# Patient Record
Sex: FEMALE | Race: WHITE | NOT HISPANIC OR LATINO
[De-identification: names, ages, dates, MRNs, and addresses within clinical notes are randomized per-mention and may not be internally consistent; named-entity substitution may affect disease eponyms.]

---

## 2023-09-08 ENCOUNTER — ASOB RESULT (OUTPATIENT)
Age: 33
End: 2023-09-08

## 2023-09-08 ENCOUNTER — APPOINTMENT (OUTPATIENT)
Dept: ANTEPARTUM | Facility: CLINIC | Age: 33
End: 2023-09-08
Payer: COMMERCIAL

## 2023-09-08 PROCEDURE — 76813 OB US NUCHAL MEAS 1 GEST: CPT

## 2023-09-08 PROCEDURE — 76801 OB US < 14 WKS SINGLE FETUS: CPT

## 2023-11-03 ENCOUNTER — ASOB RESULT (OUTPATIENT)
Age: 33
End: 2023-11-03

## 2023-11-03 ENCOUNTER — TRANSCRIPTION ENCOUNTER (OUTPATIENT)
Age: 33
End: 2023-11-03

## 2023-11-03 ENCOUNTER — APPOINTMENT (OUTPATIENT)
Dept: ANTEPARTUM | Facility: CLINIC | Age: 33
End: 2023-11-03
Payer: COMMERCIAL

## 2023-11-03 PROCEDURE — 76817 TRANSVAGINAL US OBSTETRIC: CPT

## 2023-11-03 PROCEDURE — 76811 OB US DETAILED SNGL FETUS: CPT | Mod: 59

## 2023-11-21 ENCOUNTER — APPOINTMENT (OUTPATIENT)
Dept: ANTEPARTUM | Facility: CLINIC | Age: 33
End: 2023-11-21
Payer: COMMERCIAL

## 2023-11-21 ENCOUNTER — ASOB RESULT (OUTPATIENT)
Age: 33
End: 2023-11-21

## 2023-11-21 PROCEDURE — 76816 OB US FOLLOW-UP PER FETUS: CPT

## 2024-01-04 ENCOUNTER — EMERGENCY (EMERGENCY)
Facility: HOSPITAL | Age: 34
LOS: 0 days | Discharge: HOME | End: 2024-01-04
Attending: EMERGENCY MEDICINE
Payer: COMMERCIAL

## 2024-01-04 VITALS — HEIGHT: 66 IN | WEIGHT: 136.91 LBS

## 2024-01-04 VITALS
OXYGEN SATURATION: 100 % | HEART RATE: 90 BPM | DIASTOLIC BLOOD PRESSURE: 95 MMHG | RESPIRATION RATE: 20 BRPM | TEMPERATURE: 98 F | SYSTOLIC BLOOD PRESSURE: 137 MMHG

## 2024-01-04 DIAGNOSIS — Z04.3 ENCOUNTER FOR EXAMINATION AND OBSERVATION FOLLOWING OTHER ACCIDENT: ICD-10-CM

## 2024-01-04 DIAGNOSIS — Z34.93 ENCOUNTER FOR SUPERVISION OF NORMAL PREGNANCY, UNSPECIFIED, THIRD TRIMESTER: ICD-10-CM

## 2024-01-04 PROCEDURE — 99284 EMERGENCY DEPT VISIT MOD MDM: CPT

## 2024-01-04 PROCEDURE — 99283 EMERGENCY DEPT VISIT LOW MDM: CPT

## 2024-01-04 PROCEDURE — 93010 ELECTROCARDIOGRAM REPORT: CPT

## 2024-01-04 PROCEDURE — 93005 ELECTROCARDIOGRAM TRACING: CPT

## 2024-01-04 NOTE — ED STATDOCS - MUSCULOSKELETAL, MLM
range of motion is not limited and there is no muscle tenderness. no midline spinal tenderness, no sacral ttp, no bony tenderness or abnormalities

## 2024-01-04 NOTE — ED ADULT TRIAGE NOTE - CHIEF COMPLAINT QUOTE
Patient ambulates to ER from home c/o falling down 5 stairs. Pt 29 weeks Pregnant. Denies any pain or discomfort. Pt stated she fell around 7pm. Last time she fell baby was 6:30pm. Spoke to Edna BARRIOS, pt to be seen in ER and they will come down after seen by MD.  No PMHx. NKA. No meds PTA.

## 2024-01-04 NOTE — ED STATDOCS - OBJECTIVE STATEMENT
34 y/o female, denies PMHx  approximately 29 weeks pregnant presents to the ED s/p slip and fall down 5 stairs around 19:00 landing on her buttock. no LOC, no head trauma, no abdominal pain, no vaginal bleeding, no vaginal discharge, no back pain. No acute complaints

## 2024-01-04 NOTE — ED STATDOCS - PATIENT PORTAL LINK FT
You can access the FollowMyHealth Patient Portal offered by [facility] by registering at the following website: [email or web address]. By joining [employer]’s FollowMyHealth portal, you will also be able to view your health information using other applications (apps) compatible with our system. You can access the FollowMyHealth Patient Portal offered by [facility] by registering at the following website: [email or web address]. By joining [employer]’s FollowMyHealth portal, you will also be able to view your health information using other applications (apps) compatible with our system.

## 2024-01-04 NOTE — ED STATDOCS - NSFOLLOWUPINSTRUCTIONS_ED_ALL_ED_FT
Return to the ER for contractions, abdominal pain, vaginal bleeding or fluid loss from vagina.    OK to take tylenol for sore muscles and pain from the fall.  Take tylenol 650mg every 4-6 hours as needed for pain.    See your OB/GYN within 1 week.

## 2024-01-04 NOTE — ED STATDOCS - CARE PLAN
1 Principal Discharge DX:	Fall down stairs, initial encounter  Secondary Diagnosis:	Third trimester pregnancy

## 2024-01-04 NOTE — ED STATDOCS - CLINICAL SUMMARY MEDICAL DECISION MAKING FREE TEXT BOX
32 y/o female, denies PMHx  approximately 29 weeks pregnant presents to the ED s/p slip and fall down 5 stairs around 19:00, Pt with no complaints at this time, fell onto buttocks, denies hitting abdomen, denies head injury, denies loc 32 y/o female, denies PMHx  approximately 29 weeks pregnant presents to the ED s/p slip and fall down 5 stairs around 19:00, Pt with no complaints at this time, fell onto buttocks, denies hitting abdomen, denies head injury, denies loc. L&D contacted who sent nurse for NST which is currently in progress, if cleared by OB pt can be discharged, no physical injuries on exam from fall 32 y/o female, denies PMHx  approximately 29 weeks pregnant presents to the ED s/p slip and fall down 5 stairs around 19:00, Pt with no complaints at this time, fell onto buttocks, denies hitting abdomen, denies head injury, denies loc. L&D contacted who sent nurse for NST which is currently in progress, if cleared by OB pt can be discharged, no physical injuries on exam from fall.    20:34 JG: Discussed case with OB attending Dr. Damon who reviewed patients NST which was normal and states ok for patient to be discharged from OB perspective.  Patient denies abdominal pain, vaginal bleeding or fluid loss, so pelvic US not recommended at this time but strict return precautions given for return to the ED if experiencing these symptoms. 34 y/o female, denies PMHx  approximately 29 weeks pregnant presents to the ED s/p slip and fall down 5 stairs around 19:00, Pt with no complaints at this time, fell onto buttocks, denies hitting abdomen, denies head injury, denies loc. L&D contacted who sent nurse for NST which is currently in progress, if cleared by OB pt can be discharged, no physical injuries on exam from fall.    20:34 JG: Discussed case with OB attending Dr. Damon who reviewed patients NST which was normal and states ok for patient to be discharged from OB perspective.  Patient denies abdominal pain, vaginal bleeding or fluid loss, so pelvic US not recommended at this time but strict return precautions given for return to the ED if experiencing these symptoms.

## 2024-01-04 NOTE — ED ADULT NURSE REASSESSMENT NOTE - NS ED NURSE REASSESS COMMENT FT1
Pt seen evaluated treated by Dr. Diaz. NO nursing interventions required. Pt discharged home with strict follow up instructions. Pt left ED with all belongings. 2019

## 2024-02-23 ENCOUNTER — APPOINTMENT (OUTPATIENT)
Dept: ANTEPARTUM | Facility: CLINIC | Age: 34
End: 2024-02-23
Payer: COMMERCIAL

## 2024-02-23 ENCOUNTER — INPATIENT (INPATIENT)
Facility: HOSPITAL | Age: 34
LOS: 2 days | Discharge: HOME | End: 2024-02-26
Attending: OBSTETRICS & GYNECOLOGY | Admitting: OBSTETRICS & GYNECOLOGY
Payer: COMMERCIAL

## 2024-02-23 ENCOUNTER — TRANSCRIPTION ENCOUNTER (OUTPATIENT)
Age: 34
End: 2024-02-23

## 2024-02-23 ENCOUNTER — ASOB RESULT (OUTPATIENT)
Age: 34
End: 2024-02-23

## 2024-02-23 VITALS
TEMPERATURE: 99 F | SYSTOLIC BLOOD PRESSURE: 139 MMHG | RESPIRATION RATE: 16 BRPM | HEIGHT: 72 IN | HEART RATE: 106 BPM | DIASTOLIC BLOOD PRESSURE: 93 MMHG | WEIGHT: 147.05 LBS

## 2024-02-23 DIAGNOSIS — O26.899 OTHER SPECIFIED PREGNANCY RELATED CONDITIONS, UNSPECIFIED TRIMESTER: ICD-10-CM

## 2024-02-23 DIAGNOSIS — Z98.890 OTHER SPECIFIED POSTPROCEDURAL STATES: Chronic | ICD-10-CM

## 2024-02-23 LAB
ABO RH CONFIRMATION: SIGNIFICANT CHANGE UP
ALBUMIN SERPL ELPH-MCNC: 2.7 G/DL — LOW (ref 3.3–5)
ALP SERPL-CCNC: 161 U/L — HIGH (ref 40–120)
ALT FLD-CCNC: 23 U/L — SIGNIFICANT CHANGE UP (ref 12–78)
ANION GAP SERPL CALC-SCNC: 7 MMOL/L — SIGNIFICANT CHANGE UP (ref 5–17)
APTT BLD: 24.1 SEC — LOW (ref 24.5–35.6)
AST SERPL-CCNC: 21 U/L — SIGNIFICANT CHANGE UP (ref 15–37)
BASOPHILS # BLD AUTO: 0.02 K/UL — SIGNIFICANT CHANGE UP (ref 0–0.2)
BASOPHILS NFR BLD AUTO: 0.2 % — SIGNIFICANT CHANGE UP (ref 0–2)
BILIRUB SERPL-MCNC: 0.3 MG/DL — SIGNIFICANT CHANGE UP (ref 0.2–1.2)
BLD GP AB SCN SERPL QL: SIGNIFICANT CHANGE UP
BUN SERPL-MCNC: 6 MG/DL — LOW (ref 7–23)
CALCIUM SERPL-MCNC: 9.3 MG/DL — SIGNIFICANT CHANGE UP (ref 8.5–10.1)
CHLORIDE SERPL-SCNC: 108 MMOL/L — SIGNIFICANT CHANGE UP (ref 96–108)
CO2 SERPL-SCNC: 23 MMOL/L — SIGNIFICANT CHANGE UP (ref 22–31)
CREAT ?TM UR-MCNC: 32 MG/DL — SIGNIFICANT CHANGE UP
CREAT SERPL-MCNC: 0.61 MG/DL — SIGNIFICANT CHANGE UP (ref 0.5–1.3)
EGFR: 122 ML/MIN/1.73M2 — SIGNIFICANT CHANGE UP
EOSINOPHIL # BLD AUTO: 0.03 K/UL — SIGNIFICANT CHANGE UP (ref 0–0.5)
EOSINOPHIL NFR BLD AUTO: 0.3 % — SIGNIFICANT CHANGE UP (ref 0–6)
FIBRINOGEN PPP-MCNC: 632 MG/DL — HIGH (ref 200–435)
GLUCOSE SERPL-MCNC: 118 MG/DL — HIGH (ref 70–99)
HCT VFR BLD CALC: 38.1 % — SIGNIFICANT CHANGE UP (ref 34.5–45)
HGB BLD-MCNC: 13 G/DL — SIGNIFICANT CHANGE UP (ref 11.5–15.5)
IMM GRANULOCYTES NFR BLD AUTO: 1.7 % — HIGH (ref 0–0.9)
INR BLD: 0.83 RATIO — LOW (ref 0.85–1.18)
LDH SERPL L TO P-CCNC: 202 U/L — SIGNIFICANT CHANGE UP (ref 84–241)
LYMPHOCYTES # BLD AUTO: 2.37 K/UL — SIGNIFICANT CHANGE UP (ref 1–3.3)
LYMPHOCYTES # BLD AUTO: 20.8 % — SIGNIFICANT CHANGE UP (ref 13–44)
MCHC RBC-ENTMCNC: 30.4 PG — SIGNIFICANT CHANGE UP (ref 27–34)
MCHC RBC-ENTMCNC: 34.1 GM/DL — SIGNIFICANT CHANGE UP (ref 32–36)
MCV RBC AUTO: 89 FL — SIGNIFICANT CHANGE UP (ref 80–100)
MONOCYTES # BLD AUTO: 1.15 K/UL — HIGH (ref 0–0.9)
MONOCYTES NFR BLD AUTO: 10.1 % — SIGNIFICANT CHANGE UP (ref 2–14)
NEUTROPHILS # BLD AUTO: 7.64 K/UL — HIGH (ref 1.8–7.4)
NEUTROPHILS NFR BLD AUTO: 66.9 % — SIGNIFICANT CHANGE UP (ref 43–77)
PLATELET # BLD AUTO: 199 K/UL — SIGNIFICANT CHANGE UP (ref 150–400)
POTASSIUM SERPL-MCNC: 3.9 MMOL/L — SIGNIFICANT CHANGE UP (ref 3.5–5.3)
POTASSIUM SERPL-SCNC: 3.9 MMOL/L — SIGNIFICANT CHANGE UP (ref 3.5–5.3)
PROT ?TM UR-MCNC: 6 MG/DL — SIGNIFICANT CHANGE UP (ref 0–12)
PROT SERPL-MCNC: 6.7 GM/DL — SIGNIFICANT CHANGE UP (ref 6–8.3)
PROT/CREAT UR-RTO: 0.2 RATIO — SIGNIFICANT CHANGE UP (ref 0–0.2)
PROTHROM AB SERPL-ACNC: 9.4 SEC — LOW (ref 9.5–13)
RBC # BLD: 4.28 M/UL — SIGNIFICANT CHANGE UP (ref 3.8–5.2)
RBC # FLD: 13.7 % — SIGNIFICANT CHANGE UP (ref 10.3–14.5)
SODIUM SERPL-SCNC: 138 MMOL/L — SIGNIFICANT CHANGE UP (ref 135–145)
URATE SERPL-MCNC: 5.2 MG/DL — SIGNIFICANT CHANGE UP (ref 2.5–7)
WBC # BLD: 11.4 K/UL — HIGH (ref 3.8–10.5)
WBC # FLD AUTO: 11.4 K/UL — HIGH (ref 3.8–10.5)

## 2024-02-23 PROCEDURE — 88307 TISSUE EXAM BY PATHOLOGIST: CPT

## 2024-02-23 PROCEDURE — 86780 TREPONEMA PALLIDUM: CPT

## 2024-02-23 PROCEDURE — 36430 TRANSFUSION BLD/BLD COMPNT: CPT

## 2024-02-23 PROCEDURE — 59050 FETAL MONITOR W/REPORT: CPT

## 2024-02-23 PROCEDURE — 80053 COMPREHEN METABOLIC PANEL: CPT

## 2024-02-23 PROCEDURE — C1889: CPT

## 2024-02-23 PROCEDURE — P9016: CPT

## 2024-02-23 PROCEDURE — 85610 PROTHROMBIN TIME: CPT

## 2024-02-23 PROCEDURE — 86850 RBC ANTIBODY SCREEN: CPT

## 2024-02-23 PROCEDURE — 36415 COLL VENOUS BLD VENIPUNCTURE: CPT

## 2024-02-23 PROCEDURE — 85014 HEMATOCRIT: CPT

## 2024-02-23 PROCEDURE — 85025 COMPLETE CBC W/AUTO DIFF WBC: CPT

## 2024-02-23 PROCEDURE — 85027 COMPLETE CBC AUTOMATED: CPT

## 2024-02-23 PROCEDURE — 94760 N-INVAS EAR/PLS OXIMETRY 1: CPT

## 2024-02-23 PROCEDURE — 85018 HEMOGLOBIN: CPT

## 2024-02-23 PROCEDURE — 99214 OFFICE O/P EST MOD 30 MIN: CPT

## 2024-02-23 PROCEDURE — 86901 BLOOD TYPING SEROLOGIC RH(D): CPT

## 2024-02-23 PROCEDURE — 86920 COMPATIBILITY TEST SPIN: CPT

## 2024-02-23 PROCEDURE — 86900 BLOOD TYPING SEROLOGIC ABO: CPT

## 2024-02-23 PROCEDURE — 59200 INSERT CERVICAL DILATOR: CPT

## 2024-02-23 PROCEDURE — 85730 THROMBOPLASTIN TIME PARTIAL: CPT

## 2024-02-23 PROCEDURE — 85384 FIBRINOGEN ACTIVITY: CPT

## 2024-02-23 PROCEDURE — 76816 OB US FOLLOW-UP PER FETUS: CPT

## 2024-02-23 RX ORDER — CHLORHEXIDINE GLUCONATE 213 G/1000ML
1 SOLUTION TOPICAL DAILY
Refills: 0 | Status: DISCONTINUED | OUTPATIENT
Start: 2024-02-23 | End: 2024-02-25

## 2024-02-23 RX ORDER — SODIUM CHLORIDE 9 MG/ML
1000 INJECTION, SOLUTION INTRAVENOUS
Refills: 0 | Status: DISCONTINUED | OUTPATIENT
Start: 2024-02-23 | End: 2024-02-25

## 2024-02-23 RX ORDER — CITRIC ACID/SODIUM CITRATE 300-500 MG
15 SOLUTION, ORAL ORAL EVERY 6 HOURS
Refills: 0 | Status: DISCONTINUED | OUTPATIENT
Start: 2024-02-23 | End: 2024-02-25

## 2024-02-23 RX ORDER — OXYTOCIN 10 UNIT/ML
333.33 VIAL (ML) INJECTION
Qty: 20 | Refills: 0 | Status: COMPLETED | OUTPATIENT
Start: 2024-02-23 | End: 2024-02-23

## 2024-02-23 RX ORDER — DINOPROSTONE 10 MG/241MG
10 INSERT VAGINAL ONCE
Refills: 0 | Status: COMPLETED | OUTPATIENT
Start: 2024-02-24 | End: 2024-02-24

## 2024-02-23 NOTE — OB RN PATIENT PROFILE - NSSDOHFOODLAST_OBGYN_A_OB
called daughter in law who states she did not receive Pulse oximetry for overnight.  In 5/19 TE I-see that Lacrmarlenea Merrick was called and order entered -      Esvin Noland spoke with Brigitte who states they will receive device in 3-5 days - daughter in law notified and number for Lacretia Merrick given never true

## 2024-02-23 NOTE — OB PROVIDER TRIAGE NOTE - HISTORY OF PRESENT ILLNESS
OB Triage H&P  Primary OB: Dr. Augustin   CC: elevated BPs    HPI: 33yo  at 36w6d by LMP GUSTABO 3/16/2024 c/w 1st trimester sono presents per primary OB for r/o PEC given elevated BP in the office (140s/90s). Pt denies headaches, visual changes, LE swelling, RUQ/Epigastric tenderness, CP, SOB, N/V, ctx, vaginal bleeding, LOF and appreciates positive fetal movement. Pt notes intermittent silver stars in her vision when bending over and coming back up but currently denies it.     AP issues:  #Failed 1hr GCT, passed 3h GTT    OB hx:   Gyn hx: denies fibroids, endometriosis, abn PAP, STIs, or cysts  PMHx: denies  PSHx: knee sx  Fam hx: dad - cardiac disease  Social hx: denies tobacco, illicit drugs, and alcohol use  NKDA  Meds: PNV    PNL in chart pending receipt per pt GBS negative    Outside US: () EFW 2558g @ 12%, AC 19%, placenta posterior    PE:   VS: /93, 139/91, 131/84, 147/90, HR 90s-100s  Gen: NAD, A&Ox3  Abd: non tender to RUQ/epigastric area, gravid, soft  Ext: non tender/erythema/edema in LE b/l  SVE/SSE: deferred     Bedside sono: vtx, DVP 3.5cm     EFM: 135bpm baseline, moderate variability, +2 qualifying accels, no decels  [place]: irritability    A/P: 33yo  at 36w6d presents for r/o PEC. Fetal status reassuring. BPs mild range, given office BP at 10am mild range and current BP mild range >4h apart meets criteria for gHTN. Lab work pending, *** OB Triage H&P  Primary OB: Dr. Augustin   CC: elevated BPs    HPI: 33yo  at 36w6d by LMP GUSTBAO 3/16/2024 c/w 1st trimester sono presents per primary OB for r/o PEC given elevated BP in the office (140s/90s). Pt denies headaches, visual changes, LE swelling, RUQ/Epigastric tenderness, CP, SOB, N/V, ctx, vaginal bleeding, LOF and appreciates positive fetal movement. Pt notes intermittent silver stars in her vision when bending over and coming back up but currently denies it.     AP issues:  #Failed 1hr GCT, passed 3h GTT    OB hx:   Gyn hx: denies fibroids, endometriosis, abn PAP, STIs, or cysts  PMHx: denies  PSHx: knee sx  Fam hx: dad - cardiac disease  Social hx: denies tobacco, illicit drugs, and alcohol use  NKDA  Meds: PNV    PNL in chart pending receipt per pt GBS negative    Outside US: () EFW 2558g @ 12%, AC 19%, placenta posterior    PE:   VS: /93, 139/91, 131/84, 147/90, HR 90s-100s  Gen: NAD, A&Ox3  Abd: non tender to RUQ/epigastric area, gravid, soft  Ext: non tender/erythema/edema in LE b/l  SVE/SSE: deferred     Bedside sono: vtx, DVP 3.5cm     EFM: 135bpm baseline, moderate variability, +2 qualifying accels, no decels  [place]: irritability    H/H 13.0/38.1 Plt 199, coags wnl, Fibrinogen 613, Cr 0.61, AST/ALT 23/21    A/P: 33yo  at 36w6d presents for r/o PEC. Fetal status reassuring. BPs mild range, given office BP at 10am mild range and current BP mild range >4h apart meets criteria for gHTN. Lab work unremarkable UPC 0.2. Plan to admit and start IOL at 12am (37w0d for gHTN).   - admit to L&D  - monitor BPs  - admit labs  - Continue close monitoring  - anesthesia consult for epidural  - IOL method pending SVE  - CLD    Discussed with Dr. Harrell and Dr. Augustin

## 2024-02-23 NOTE — DISCHARGE NOTE OB - PATIENT PORTAL LINK FT
You can access the FollowMyHealth Patient Portal offered by [facility] by registering at the following website: [email or web address]. By joining [employer]’s FollowMyHealth portal, you will also be able to view your health information using other applications (apps) compatible with our system.

## 2024-02-23 NOTE — OB RN PATIENT PROFILE - FUNCTIONAL ASSESSMENT - DAILY ACTIVITY 5.
Continue to MONITOR CLOSELY to determine the need for TREATMENT and INCREASE/DECREASE in length of time till next follow up visit. 4 = No assist / stand by assistance

## 2024-02-23 NOTE — OB PROVIDER H&P - ASSESSMENT
A/P: 35yo  36.6 weeks for IOL 2/2 gHTN  - Admit to L&D  - Routine labs   - HELLP labs  - Monitor BP  - EFM/TOCO  - Clear liquid diet  - IVH  - Anesthesia consult   - Bicitra  - Expect     dw Dr Augustin  LCavalieri PAC

## 2024-02-23 NOTE — OB PROVIDER H&P - HISTORY OF PRESENT ILLNESS
OB Triage H&P  Primary OB: Dr. Augustin   CC: elevated BPs    HPI: 35yo  at 36w6d by LMP GUSTABO 3/16/2024 c/w 1st trimester sono presents per primary OB for r/o PEC given elevated BP in the office (140s/90s). Pt denies headaches, visual changes, LE swelling, RUQ/Epigastric tenderness, CP, SOB, N/V, ctx, vaginal bleeding, LOF and appreciates positive fetal movement. Pt notes intermittent silver stars in her vision when bending over and coming back up but currently denies it.     AP issues:  #Failed 1hr GCT, passed 3h GTT    OB hx:   Gyn hx: denies fibroids, endometriosis, abn PAP, STIs, or cysts  PMHx: denies  PSHx: knee sx  Fam hx: dad - cardiac disease  Social hx: denies tobacco, illicit drugs, and alcohol use  NKDA  Meds: PNV    PNL in chart pending receipt per pt GBS negative    Outside US: () EFW 2558g @ 12%, AC 19%, placenta posterior    PE:   VS: /93, 139/91, 131/84, 147/90, HR 90s-100s  Gen: NAD, A&Ox3  Abd: non tender to RUQ/epigastric area, gravid, soft  Ext: non tender/erythema/edema in LE b/l  SVE/SSE: deferred     Bedside sono: vtx, DVP 3.5cm     EFM: 135bpm baseline, moderate variability, +2 qualifying accels, no decels  [place]: irritability    H/H 13.0/38.1 Plt 199, coags wnl, Fibrinogen 613, Cr 0.61, AST/ALT 23/21    A/P: 35yo  at 36w6d presents for r/o PEC. Fetal status reassuring. BPs mild range, given office BP at 10am mild range and current BP mild range >4h apart meets criteria for gHTN. Lab work unremarkable UPC 0.2. Plan to admit and start IOL at 12am (37w0d for gHTN).   - admit to L&D  - monitor BPs  - admit labs  - Continue close monitoring  - anesthesia consult for epidural  - IOL method pending SVE  - CLD    Discussed with Dr. Harrell and Dr. Augustin

## 2024-02-23 NOTE — DISCHARGE NOTE OB - CARE PROVIDER_API CALL
Farhat Augustin  Obstetrics and Gynecology  [address] [facility], [address] [place] [id number]  Phone: [phone]  Fax: [phone]  Established Patient  Follow Up Time: 1 month   Farhat Augustin  Obstetrics and Gynecology  [address] [facility], [address] [place] [id number]  Phone: [phone]  Fax: [phone]  Established Patient  Follow Up Time: 1 week

## 2024-02-23 NOTE — DISCHARGE NOTE OB - MEDICATION SUMMARY - MEDICATIONS TO TAKE
I will START or STAY ON the medications listed below when I get home from the hospital:    ibuprofen 600 mg oral tablet  -- 1 tab(s) by mouth every 6 hours as needed for  moderate pain  -- Indication: For for moderate pain    acetaminophen 325 mg oral tablet  -- 3 tab(s) by mouth every 6 hours as needed for  moderate pain  -- Indication: For for moderate pain    Prenatal Multivitamins with Folic Acid 1 mg oral tablet  -- 1 tab(s) by mouth once a day  -- Indication: For continue daily    ascorbic acid 500 mg oral tablet, chewable  -- 2 tab(s) chewed once a day  -- Indication: For for anemia of acute blood loss from entrapped placenta

## 2024-02-23 NOTE — DISCHARGE NOTE OB - CARE PLAN
1 Principal Discharge DX:	Delivery normal  Assessment and plan of treatment:	Congratulations!  Please call the office with any questions or problems.  Place nothing into the vagina for six weeks ( no tampons, sex, or douching).  Call the office for a postpartum appointment to be scheduled six weeks after delivery.  Expect to feel hot flashes in the first two weeks after delivery, especially if your breasts are engorged.  Secondary Diagnosis:	Retained placenta, delivered, current hospitalization  Assessment and plan of treatment:	Please call the office if you have persistent dizziness or heavy vaginal bleeding.

## 2024-02-23 NOTE — DISCHARGE NOTE OB - PLAN OF CARE
Congratulations!  Please call the office with any questions or problems.  Place nothing into the vagina for six weeks ( no tampons, sex, or douching).  Call the office for a postpartum appointment to be scheduled six weeks after delivery.  Expect to feel hot flashes in the first two weeks after delivery, especially if your breasts are engorged. Please call the office if you have persistent dizziness or heavy vaginal bleeding.

## 2024-02-23 NOTE — DISCHARGE NOTE OB - PROVIDER TOKENS
PROVIDER:[TOKEN:[[id number]],FOLLOWUP:[1 month],ESTABLISHEDPATIENT:[T]] PROVIDER:[TOKEN:[[id number]],FOLLOWUP:[1 week],ESTABLISHEDPATIENT:[T]]

## 2024-02-23 NOTE — DISCHARGE NOTE OB - HOSPITAL COURSE
35 yo  at 36/67 weeks gestation, GBS negative presented as directed by OB's office for evaluation of pre-eclampsia with BPs 140's/90's with no CNS irritability.  BPs on L+D were also 140's/90's and labs were normal and  UPCR 0.2.                 13.0   11.40 )-----------( 199      ( 2024 15:37 )             38.1   Aspartate Aminotransferase (AST/SGOT): 21 U/L (24 @ 15:37)  Alanine Aminotransferase (ALT/SGPT): 23 U/L (24 @ 15:37)  Induction was initiated with Cervidil for her cervix was firm/closed/thick/high. 35 yo  at 36/67 weeks gestation, GBS negative presented as directed by OB's office for evaluation of pre-eclampsia with BPs 140's/90's with no CNS irritability.  BPs on L+D were also 140's/90's and labs were normal and  UPCR 0.2.                 13.0   11.40 )-----------( 199      ( 2024 15:37 )             38.1   Aspartate Aminotransferase (AST/SGOT): 21 U/L (24 @ 15:37)  Alanine Aminotransferase (ALT/SGPT): 23 U/L (24 @ 15:37)  Induction was initiated with Cervidil for her cervix was firm/closed/thick/high.  She progressed from 0cm to full dilatation with Cervidil/cervical ripening balloon/pitocin.  She pushed for 30 minutes to deliver a viable female infant from ANAM position with no nuchal cord, over a first degree laceration of the perineum,, Apgars 9/9, weighing 5lb 13oz or 2630g, length 17 3/4 inches, at 00:33am.  Placenta required extra time for separation.  Upon reassessment of the placenta at 45 and 60 minutes after delivery, it was determined that placenta was not .  Placenta was noted to be sequestered in the right cornual region of the uterus.  Total EBL was 900.  Immediate postop Hgb was 8.5 and she was stable after the procedure. AM Hgb 7.2 accompanied by mild tachycardia and reported dizziness was addressed with transfusion of 2 U PRBCs with good response.  Post transfusion CBC:                        9.5    x     )-----------( x        ( 2024 20:47 )             26.8   She is being discharged home on postpartum day 2 in stable condition.

## 2024-02-23 NOTE — OB PROVIDER H&P - ATTENDING COMMENTS
Vital Signs Last 24 Hrs  T(C): 37 (23 Feb 2024 17:39), Max: 37 (23 Feb 2024 17:39)  T(F): 98.6 (23 Feb 2024 17:39), Max: 98.6 (23 Feb 2024 17:39)  HR: 106 (23 Feb 2024 17:39) (106 - 106)  BP: 139/93 (23 Feb 2024 17:39) (139/93 - 139/93)  RR: 16 (23 Feb 2024 17:39) (16 - 16)  Gen:  in NAD  Abd:  soft, Gravid, nontender, no RUQ or epigastric tenderness  VE:  Cx:  closed/thick/vtx/-3/firm/posterior/Membranes intact  DTRs +1 BL UEs  A/P:  She was diagnosed with pre-eclampsia with no severe features.  Induction of labor was planned and discussed at length with pt.  Plan to initiate induction with Cervidil for Cervix is firm and thick, Vtx at -3 station.

## 2024-02-24 LAB
ALBUMIN SERPL ELPH-MCNC: 2.2 G/DL — LOW (ref 3.3–5)
ALP SERPL-CCNC: 143 U/L — HIGH (ref 40–120)
ALT FLD-CCNC: 22 U/L — SIGNIFICANT CHANGE UP (ref 12–78)
ANION GAP SERPL CALC-SCNC: 4 MMOL/L — LOW (ref 5–17)
APTT BLD: 24.7 SEC — SIGNIFICANT CHANGE UP (ref 24.5–35.6)
AST SERPL-CCNC: 17 U/L — SIGNIFICANT CHANGE UP (ref 15–37)
BASOPHILS # BLD AUTO: 0.03 K/UL — SIGNIFICANT CHANGE UP (ref 0–0.2)
BASOPHILS NFR BLD AUTO: 0.2 % — SIGNIFICANT CHANGE UP (ref 0–2)
BILIRUB SERPL-MCNC: 0.3 MG/DL — SIGNIFICANT CHANGE UP (ref 0.2–1.2)
BUN SERPL-MCNC: 8 MG/DL — SIGNIFICANT CHANGE UP (ref 7–23)
CALCIUM SERPL-MCNC: 8.9 MG/DL — SIGNIFICANT CHANGE UP (ref 8.5–10.1)
CHLORIDE SERPL-SCNC: 109 MMOL/L — HIGH (ref 96–108)
CO2 SERPL-SCNC: 24 MMOL/L — SIGNIFICANT CHANGE UP (ref 22–31)
CREAT SERPL-MCNC: 0.55 MG/DL — SIGNIFICANT CHANGE UP (ref 0.5–1.3)
EGFR: 125 ML/MIN/1.73M2 — SIGNIFICANT CHANGE UP
EOSINOPHIL # BLD AUTO: 0.03 K/UL — SIGNIFICANT CHANGE UP (ref 0–0.5)
EOSINOPHIL NFR BLD AUTO: 0.2 % — SIGNIFICANT CHANGE UP (ref 0–6)
FIBRINOGEN PPP-MCNC: 495 MG/DL — HIGH (ref 200–435)
GLUCOSE SERPL-MCNC: 101 MG/DL — HIGH (ref 70–99)
HCT VFR BLD CALC: 33.3 % — LOW (ref 34.5–45)
HGB BLD-MCNC: 11.3 G/DL — LOW (ref 11.5–15.5)
IMM GRANULOCYTES NFR BLD AUTO: 0.9 % — SIGNIFICANT CHANGE UP (ref 0–0.9)
INR BLD: 0.86 RATIO — SIGNIFICANT CHANGE UP (ref 0.85–1.18)
LYMPHOCYTES # BLD AUTO: 15.5 % — SIGNIFICANT CHANGE UP (ref 13–44)
LYMPHOCYTES # BLD AUTO: 2.24 K/UL — SIGNIFICANT CHANGE UP (ref 1–3.3)
MCHC RBC-ENTMCNC: 30.5 PG — SIGNIFICANT CHANGE UP (ref 27–34)
MCHC RBC-ENTMCNC: 33.9 GM/DL — SIGNIFICANT CHANGE UP (ref 32–36)
MCV RBC AUTO: 90 FL — SIGNIFICANT CHANGE UP (ref 80–100)
MONOCYTES # BLD AUTO: 1.32 K/UL — HIGH (ref 0–0.9)
MONOCYTES NFR BLD AUTO: 9.1 % — SIGNIFICANT CHANGE UP (ref 2–14)
NEUTROPHILS # BLD AUTO: 10.71 K/UL — HIGH (ref 1.8–7.4)
NEUTROPHILS NFR BLD AUTO: 74.1 % — SIGNIFICANT CHANGE UP (ref 43–77)
PLATELET # BLD AUTO: 161 K/UL — SIGNIFICANT CHANGE UP (ref 150–400)
POTASSIUM SERPL-MCNC: 3.9 MMOL/L — SIGNIFICANT CHANGE UP (ref 3.5–5.3)
POTASSIUM SERPL-SCNC: 3.9 MMOL/L — SIGNIFICANT CHANGE UP (ref 3.5–5.3)
PROT SERPL-MCNC: 5.8 GM/DL — LOW (ref 6–8.3)
PROTHROM AB SERPL-ACNC: 9.8 SEC — SIGNIFICANT CHANGE UP (ref 9.5–13)
RBC # BLD: 3.7 M/UL — LOW (ref 3.8–5.2)
RBC # FLD: 13.7 % — SIGNIFICANT CHANGE UP (ref 10.3–14.5)
SODIUM SERPL-SCNC: 137 MMOL/L — SIGNIFICANT CHANGE UP (ref 135–145)
T PALLIDUM AB TITR SER: NEGATIVE — SIGNIFICANT CHANGE UP
WBC # BLD: 14.46 K/UL — HIGH (ref 3.8–10.5)
WBC # FLD AUTO: 14.46 K/UL — HIGH (ref 3.8–10.5)

## 2024-02-24 RX ORDER — MAGNESIUM HYDROXIDE 400 MG/1
30 TABLET, CHEWABLE ORAL
Refills: 0 | Status: DISCONTINUED | OUTPATIENT
Start: 2024-02-25 | End: 2024-02-26

## 2024-02-24 RX ORDER — SIMETHICONE 80 MG/1
80 TABLET, CHEWABLE ORAL EVERY 4 HOURS
Refills: 0 | Status: DISCONTINUED | OUTPATIENT
Start: 2024-02-25 | End: 2024-02-26

## 2024-02-24 RX ORDER — DIBUCAINE 1 %
1 OINTMENT (GRAM) RECTAL EVERY 6 HOURS
Refills: 0 | Status: DISCONTINUED | OUTPATIENT
Start: 2024-02-25 | End: 2024-02-26

## 2024-02-24 RX ORDER — AER TRAVELER 0.5 G/1
1 SOLUTION RECTAL; TOPICAL EVERY 4 HOURS
Refills: 0 | Status: DISCONTINUED | OUTPATIENT
Start: 2024-02-25 | End: 2024-02-26

## 2024-02-24 RX ORDER — DIPHENHYDRAMINE HCL 50 MG
25 CAPSULE ORAL ONCE
Refills: 0 | Status: COMPLETED | OUTPATIENT
Start: 2024-02-24 | End: 2024-02-24

## 2024-02-24 RX ORDER — BENZOCAINE 10 %
1 GEL (GRAM) MUCOUS MEMBRANE EVERY 6 HOURS
Refills: 0 | Status: DISCONTINUED | OUTPATIENT
Start: 2024-02-25 | End: 2024-02-26

## 2024-02-24 RX ORDER — ACETAMINOPHEN 500 MG
975 TABLET ORAL ONCE
Refills: 0 | Status: COMPLETED | OUTPATIENT
Start: 2024-02-24 | End: 2024-02-24

## 2024-02-24 RX ORDER — OXYTOCIN 10 UNIT/ML
41.67 VIAL (ML) INJECTION
Qty: 20 | Refills: 0 | Status: DISCONTINUED | OUTPATIENT
Start: 2024-02-25 | End: 2024-02-26

## 2024-02-24 RX ORDER — OXYCODONE HYDROCHLORIDE 5 MG/1
5 TABLET ORAL ONCE
Refills: 0 | Status: DISCONTINUED | OUTPATIENT
Start: 2024-02-25 | End: 2024-02-26

## 2024-02-24 RX ORDER — DIPHENHYDRAMINE HCL 50 MG
25 CAPSULE ORAL EVERY 6 HOURS
Refills: 0 | Status: DISCONTINUED | OUTPATIENT
Start: 2024-02-25 | End: 2024-02-26

## 2024-02-24 RX ORDER — ACETAMINOPHEN 500 MG
975 TABLET ORAL
Refills: 0 | Status: DISCONTINUED | OUTPATIENT
Start: 2024-02-25 | End: 2024-02-26

## 2024-02-24 RX ORDER — PRAMOXINE HYDROCHLORIDE 150 MG/15G
1 AEROSOL, FOAM RECTAL EVERY 4 HOURS
Refills: 0 | Status: DISCONTINUED | OUTPATIENT
Start: 2024-02-25 | End: 2024-02-26

## 2024-02-24 RX ORDER — IBUPROFEN 200 MG
600 TABLET ORAL EVERY 6 HOURS
Refills: 0 | Status: COMPLETED | OUTPATIENT
Start: 2024-02-25 | End: 2025-01-23

## 2024-02-24 RX ORDER — HYDROCORTISONE 1 %
1 OINTMENT (GRAM) TOPICAL EVERY 6 HOURS
Refills: 0 | Status: DISCONTINUED | OUTPATIENT
Start: 2024-02-25 | End: 2024-02-26

## 2024-02-24 RX ORDER — SODIUM CHLORIDE 9 MG/ML
3 INJECTION INTRAMUSCULAR; INTRAVENOUS; SUBCUTANEOUS EVERY 8 HOURS
Refills: 0 | Status: DISCONTINUED | OUTPATIENT
Start: 2024-02-25 | End: 2024-02-26

## 2024-02-24 RX ORDER — OXYTOCIN 10 UNIT/ML
2 VIAL (ML) INJECTION
Qty: 30 | Refills: 0 | Status: DISCONTINUED | OUTPATIENT
Start: 2024-02-24 | End: 2024-02-26

## 2024-02-24 RX ORDER — LANOLIN
1 OINTMENT (GRAM) TOPICAL EVERY 6 HOURS
Refills: 0 | Status: DISCONTINUED | OUTPATIENT
Start: 2024-02-25 | End: 2024-02-26

## 2024-02-24 RX ORDER — OXYCODONE HYDROCHLORIDE 5 MG/1
5 TABLET ORAL
Refills: 0 | Status: DISCONTINUED | OUTPATIENT
Start: 2024-02-25 | End: 2024-02-26

## 2024-02-24 RX ADMIN — Medication 975 MILLIGRAM(S): at 19:22

## 2024-02-24 RX ADMIN — Medication 25 MILLIGRAM(S): at 01:33

## 2024-02-24 RX ADMIN — SODIUM CHLORIDE 125 MILLILITER(S): 9 INJECTION, SOLUTION INTRAVENOUS at 22:07

## 2024-02-24 RX ADMIN — DINOPROSTONE 10 MILLIGRAM(S): 10 INSERT VAGINAL at 01:14

## 2024-02-24 RX ADMIN — Medication 975 MILLIGRAM(S): at 18:26

## 2024-02-24 RX ADMIN — Medication 2 MILLIUNIT(S)/MIN: at 16:20

## 2024-02-24 RX ADMIN — Medication 2 MILLIUNIT(S)/MIN: at 22:07

## 2024-02-24 NOTE — OB PROVIDER LABOR PROGRESS NOTE - ASSESSMENT
primipara at 37 weeks gestation, induced for labor since last night for PEC without SF- stable with no severe range pressures  Epidural anaesthesia in place, pudendal block given at 22:20.  Cervical changes consistent with normal labor course  Anticipate vaginal delivery
33 yo  at 36 6/7 weeks gestation, GBS negative with PEC without SF.  Induction planned with Cervidil  Plan reviewed with pt and FOB.  Pt elects to have an enema.
FHT category I  Plan for CB and pitocin  Will reexamine ni 6h
FHT category I  S/p cook balloon  AROM'd - clear  C/w pitocin  SVE when reporting pressure

## 2024-02-24 NOTE — PROGRESS NOTE ADULT - ASSESSMENT
IUP 37 weeks admitted for IOL  now in labor  stable condition    Dr Augustin aware  efm  anticipate vaginal delivery
IUP 37 weeks undergoing IOL  stable condition    cervical balloon in place  pitocin prn  anticipate vaginal delivery    Dr Venegas 
IUP 37 weeks with preeclampsia undergoing IOL  stable condition    continue with cervidil  monitor blood pressures  efm  iv fluids  epidural on request  Dr Augustin aware of patient status

## 2024-02-24 NOTE — OB RN DELIVERY SUMMARY - NS_SEPSISRSKCALC_OBGYN_ALL_OB_FT
EOS calculated successfully. EOS Risk Factor: 0.5/1000 live births ([facility] national incidence); GA=37w1d; Temp=98.6; ROM=4.983; GBS='Negative'; Antibiotics='No antibiotics or any antibiotics < 2 hrs prior to birth'

## 2024-02-24 NOTE — OB RN DELIVERY SUMMARY - NSSELHIDDEN_OBGYN_ALL_OB_FT
[NS_DeliveryAttending1_OBGYN_ALL_OB_FT:[id number]],[NS_DeliveryRN_OBGYN_ALL_OB_FT:[email or web address]=]

## 2024-02-24 NOTE — OB PROVIDER LABOR PROGRESS NOTE - NS_OBIHIFHRDETAILS_OBGYN_ALL_OB_FT
130's, +[id number], no decelerations, mod variability, Cat 1 tracing
140bpm mod variability +accels -decels
140bpm mod variability +accels -decels
120's, +accels, no decels, min variability

## 2024-02-24 NOTE — OB PROVIDER LABOR PROGRESS NOTE - NS_SUBJECTIVE/OBJECTIVE_OBGYN_ALL_OB_FT
feeling occasional pain from contractions of variable intensity
c/o rectal pressure with contractions

## 2024-02-25 LAB
ALBUMIN SERPL ELPH-MCNC: 1.6 G/DL — LOW (ref 3.3–5)
ALBUMIN SERPL ELPH-MCNC: 1.6 G/DL — LOW (ref 3.3–5)
ALP SERPL-CCNC: 107 U/L — SIGNIFICANT CHANGE UP (ref 40–120)
ALP SERPL-CCNC: 125 U/L — HIGH (ref 40–120)
ALT FLD-CCNC: 14 U/L — SIGNIFICANT CHANGE UP (ref 12–78)
ALT FLD-CCNC: 17 U/L — SIGNIFICANT CHANGE UP (ref 12–78)
ANION GAP SERPL CALC-SCNC: 6 MMOL/L — SIGNIFICANT CHANGE UP (ref 5–17)
ANION GAP SERPL CALC-SCNC: 6 MMOL/L — SIGNIFICANT CHANGE UP (ref 5–17)
AST SERPL-CCNC: 28 U/L — SIGNIFICANT CHANGE UP (ref 15–37)
AST SERPL-CCNC: 36 U/L — SIGNIFICANT CHANGE UP (ref 15–37)
BILIRUB SERPL-MCNC: 0.2 MG/DL — SIGNIFICANT CHANGE UP (ref 0.2–1.2)
BILIRUB SERPL-MCNC: 0.3 MG/DL — SIGNIFICANT CHANGE UP (ref 0.2–1.2)
BUN SERPL-MCNC: 7 MG/DL — SIGNIFICANT CHANGE UP (ref 7–23)
BUN SERPL-MCNC: 7 MG/DL — SIGNIFICANT CHANGE UP (ref 7–23)
CALCIUM SERPL-MCNC: 7.8 MG/DL — LOW (ref 8.5–10.1)
CALCIUM SERPL-MCNC: 7.9 MG/DL — LOW (ref 8.5–10.1)
CHLORIDE SERPL-SCNC: 110 MMOL/L — HIGH (ref 96–108)
CHLORIDE SERPL-SCNC: 112 MMOL/L — HIGH (ref 96–108)
CO2 SERPL-SCNC: 22 MMOL/L — SIGNIFICANT CHANGE UP (ref 22–31)
CO2 SERPL-SCNC: 25 MMOL/L — SIGNIFICANT CHANGE UP (ref 22–31)
CREAT SERPL-MCNC: 0.56 MG/DL — SIGNIFICANT CHANGE UP (ref 0.5–1.3)
CREAT SERPL-MCNC: 0.58 MG/DL — SIGNIFICANT CHANGE UP (ref 0.5–1.3)
EGFR: 123 ML/MIN/1.73M2 — SIGNIFICANT CHANGE UP
EGFR: 124 ML/MIN/1.73M2 — SIGNIFICANT CHANGE UP
GLUCOSE SERPL-MCNC: 110 MG/DL — HIGH (ref 70–99)
GLUCOSE SERPL-MCNC: 129 MG/DL — HIGH (ref 70–99)
HCT VFR BLD CALC: 22.3 % — LOW (ref 34.5–45)
HCT VFR BLD CALC: 25 % — LOW (ref 34.5–45)
HCT VFR BLD CALC: 26.8 % — LOW (ref 34.5–45)
HGB BLD-MCNC: 7.4 G/DL — LOW (ref 11.5–15.5)
HGB BLD-MCNC: 8.6 G/DL — LOW (ref 11.5–15.5)
HGB BLD-MCNC: 9.5 G/DL — LOW (ref 11.5–15.5)
MCHC RBC-ENTMCNC: 30.3 PG — SIGNIFICANT CHANGE UP (ref 27–34)
MCHC RBC-ENTMCNC: 31.2 PG — SIGNIFICANT CHANGE UP (ref 27–34)
MCHC RBC-ENTMCNC: 33.2 GM/DL — SIGNIFICANT CHANGE UP (ref 32–36)
MCHC RBC-ENTMCNC: 34.4 GM/DL — SIGNIFICANT CHANGE UP (ref 32–36)
MCV RBC AUTO: 90.6 FL — SIGNIFICANT CHANGE UP (ref 80–100)
MCV RBC AUTO: 91.4 FL — SIGNIFICANT CHANGE UP (ref 80–100)
PLATELET # BLD AUTO: 143 K/UL — LOW (ref 150–400)
PLATELET # BLD AUTO: 172 K/UL — SIGNIFICANT CHANGE UP (ref 150–400)
POTASSIUM SERPL-MCNC: 4.4 MMOL/L — SIGNIFICANT CHANGE UP (ref 3.5–5.3)
POTASSIUM SERPL-MCNC: 4.4 MMOL/L — SIGNIFICANT CHANGE UP (ref 3.5–5.3)
POTASSIUM SERPL-SCNC: 4.4 MMOL/L — SIGNIFICANT CHANGE UP (ref 3.5–5.3)
POTASSIUM SERPL-SCNC: 4.4 MMOL/L — SIGNIFICANT CHANGE UP (ref 3.5–5.3)
PROT SERPL-MCNC: 4 GM/DL — LOW (ref 6–8.3)
PROT SERPL-MCNC: 4.1 GM/DL — LOW (ref 6–8.3)
RBC # BLD: 2.44 M/UL — LOW (ref 3.8–5.2)
RBC # BLD: 2.76 M/UL — LOW (ref 3.8–5.2)
RBC # FLD: 13.7 % — SIGNIFICANT CHANGE UP (ref 10.3–14.5)
RBC # FLD: 13.7 % — SIGNIFICANT CHANGE UP (ref 10.3–14.5)
SODIUM SERPL-SCNC: 140 MMOL/L — SIGNIFICANT CHANGE UP (ref 135–145)
SODIUM SERPL-SCNC: 141 MMOL/L — SIGNIFICANT CHANGE UP (ref 135–145)
WBC # BLD: 18.48 K/UL — HIGH (ref 3.8–10.5)
WBC # BLD: 25.03 K/UL — HIGH (ref 3.8–10.5)
WBC # FLD AUTO: 18.48 K/UL — HIGH (ref 3.8–10.5)
WBC # FLD AUTO: 25.03 K/UL — HIGH (ref 3.8–10.5)

## 2024-02-25 PROCEDURE — 88307 TISSUE EXAM BY PATHOLOGIST: CPT | Mod: 26

## 2024-02-25 RX ORDER — KETOROLAC TROMETHAMINE 30 MG/ML
30 SYRINGE (ML) INJECTION ONCE
Refills: 0 | Status: DISCONTINUED | OUTPATIENT
Start: 2024-02-25 | End: 2024-02-25

## 2024-02-25 RX ORDER — IBUPROFEN 200 MG
600 TABLET ORAL EVERY 6 HOURS
Refills: 0 | Status: DISCONTINUED | OUTPATIENT
Start: 2024-02-25 | End: 2024-02-26

## 2024-02-25 RX ORDER — SENNA PLUS 8.6 MG/1
2 TABLET ORAL AT BEDTIME
Refills: 0 | Status: DISCONTINUED | OUTPATIENT
Start: 2024-02-25 | End: 2024-02-25

## 2024-02-25 RX ADMIN — Medication 600 MILLIGRAM(S): at 12:16

## 2024-02-25 RX ADMIN — Medication 975 MILLIGRAM(S): at 10:14

## 2024-02-25 RX ADMIN — SODIUM CHLORIDE 3 MILLILITER(S): 9 INJECTION INTRAMUSCULAR; INTRAVENOUS; SUBCUTANEOUS at 22:30

## 2024-02-25 RX ADMIN — Medication 600 MILLIGRAM(S): at 12:07

## 2024-02-25 RX ADMIN — Medication 600 MILLIGRAM(S): at 23:56

## 2024-02-25 RX ADMIN — Medication 30 MILLIGRAM(S): at 06:26

## 2024-02-25 RX ADMIN — Medication 975 MILLIGRAM(S): at 21:14

## 2024-02-25 RX ADMIN — Medication 1 TABLET(S): at 09:03

## 2024-02-25 RX ADMIN — Medication 975 MILLIGRAM(S): at 09:03

## 2024-02-25 RX ADMIN — Medication 30 MILLIGRAM(S): at 07:17

## 2024-02-25 RX ADMIN — Medication 975 MILLIGRAM(S): at 15:32

## 2024-02-25 RX ADMIN — Medication 600 MILLIGRAM(S): at 18:29

## 2024-02-25 RX ADMIN — Medication 975 MILLIGRAM(S): at 22:00

## 2024-02-25 RX ADMIN — Medication 1000 MILLIUNIT(S)/MIN: at 06:26

## 2024-02-25 NOTE — PROVIDER CONTACT NOTE (SEPSIS SCREENING) - BACKGROUND:
at 0033 with retained placenta requiring D&C. D&C [id number]. QBL: 1012. Ancef 2g administered in OR by anesthesia at 0250.

## 2024-02-25 NOTE — OB PROVIDER DELIVERY SUMMARY - DELIVERY COMPLICATOINS; RETAINED PLACENTA
Patient taken to the operating room for manual removal of placenta and curettage under epidural anaesthesia

## 2024-02-25 NOTE — OB RN INTRAOPERATIVE NOTE - NSOBSELHIDDEN_OBGYN_ALL_OB_FT
[NSOBAttendingProcedure1_OBGYN_ALL_OB_FT:[id number]],[NSRNCirculatorProcedure1_OBGYN_ALL_OB_FT:[email or web address]=]

## 2024-02-25 NOTE — OB PROVIDER DELIVERY SUMMARY - NSSELHIDDEN_OBGYN_ALL_OB_FT
[NS_DeliveryAttending1_OBGYN_ALL_OB_FT:[id number]],[NS_DeliveryRN_OBGYN_ALL_OB_FT:[email or web address]=]

## 2024-02-25 NOTE — OB POSTPARTUM EVENT NOTE - NS_EVENTSUMMARY1_OBGYN_ALL_OB_FT
at 0033 to female weighing [id number], uncomplicated delivery, 1st degree laceration. At 0100, 30 min after delivery, placenta has not spontaneously delivered. Plan of care reassessed by MD Augustin and FRANK Mitchell. As per MD Augustin, based on pts vital signs, fundus assessment, and bleeding at this time, plan of care is to not intervene in delivering the placenta, more time may be given for placenta to spontaneously deliver. At 0145, 85 min after delivery, placenta has not spontaneously delivered. Plan of care reassessed by MD Augustin and FRANK Mitchell. As per MD Augustin, manual removal of placenta to be performed, pt verbally consents. Manual removal of placenta attempted by MD Augustin. Pt unable to tolerate procedure.   Manual removal unsuccessful. At 0200, decision made to proceed with D&C. Anesthesia, MD Venegas, MD Jackson and TIMI Shepard made aware.  at 0033 to female weighing [id number], uncomplicated delivery, 1st degree laceration. At 0100, 30 min after delivery, placenta has not spontaneously delivered. Plan of care reassessed by MD Augustin and FRANK Mitchell. As per MD Augustin, based on pts vital signs, fundus assessment, and bleeding at this time, plan of care is to not intervene in delivering the placenta, more time may be given for placenta to spontaneously deliver. At 0145, 75 min after delivery, placenta has not spontaneously delivered. Plan of care reassessed by MD Augustin and FRANK Mitchell. As per MD Augustin, manual removal of placenta to be performed, pt verbally consents. Manual removal of placenta attempted by MD Augustin. Pt unable to tolerate procedure.   Manual removal unsuccessful. At 0200, decision made to proceed with D&C. Anesthesia, MD Venegas, MD Jackson and TIMI Shepard made aware.  at 0033 to female weighing [id number], uncomplicated delivery, 1st degree laceration. MD Augustin remains at bedside after delivery awaiting delivery of placenta. At 0100, 30 min after delivery, placenta has not spontaneously delivered. Plan of care reassessed by MD Augustin and FRANK Mitchell. As per MD Augustin, based on pts vital signs, fundus assessment, and bleeding at this time, plan of care is to not intervene in delivering the placenta, more time may be given for placenta to spontaneously deliver. At 0145, 75 min after delivery, placenta has not spontaneously delivered. Plan of care reassessed by MD Augustin and FRANK Mitchell. As per MD Augustin, manual removal of placenta to be performed, pt verbally consents. Manual removal of placenta attempted by MD Augustin. Pt unable to tolerate procedure.   Manual removal unsuccessful. At 0200, decision made to proceed with D&C. Anesthesia, MD Venegas, MD Jackson and TIMI Shepard made aware.

## 2024-02-25 NOTE — PROVIDER CONTACT NOTE (SEPSIS SCREENING) - ACTION/TREATMENT ORDERED:
As pt received 1.5L and Ancef 2g in OR2, no new orders at this time. Plan reassessment with routine morning CBC.

## 2024-02-25 NOTE — BRIEF OPERATIVE NOTE - NSICDXBRIEFPROCEDURE_GEN_ALL_CORE_FT
PROCEDURES:  Manual removal of retained placenta with dilation and curettage of uterus 25-Feb-2024 03:40:25  Farhat Augustin

## 2024-02-25 NOTE — OB RN INTRAOPERATIVE NOTE - NSSELHIDDEN_OBGYN_ALL_OB_FT
[NS_DeliveryAttending1_OBGYN_ALL_OB_FT:[id number]=],[NS_DeliveryRN_OBGYN_ALL_OB_FT:[email or web address]=]

## 2024-02-25 NOTE — OB PROVIDER DELIVERY SUMMARY - NSPROVIDERDELIVERYNOTE_OBGYN_ALL_OB_FT
33 yo  at 37 weeks gestation with PEC without SF was induced for labor since yesterday.  She progressed from 0cm to full dilatation with Cervidil/cervical ripening balloon/pitocin.  She pushed for 30 minutes to deliver a viable female infant from ANAM position with no nuchal cord, over a first degree laceration of the perineum,, Apgars 9/9, weighing 5lb 13oz or 2630g, length 17 3/4 inches, at 00:33am.  Perineal laceration was repaired with 2-0 Vicryl.  Placenta required extra time for separation. 33 yo  at 37 weeks gestation with PEC without SF was induced for labor since yesterday.  She progressed from 0cm to full dilatation with Cervidil/cervical ripening balloon/pitocin.  She pushed for 30 minutes to deliver a viable female infant from ANAM position with no nuchal cord, over a first degree laceration of the perineum,, Apgars 9/9, weighing 5lb 13oz or 2630g, length 17 3/4 inches, at 00:33am.  Perineal laceration was repaired with 2-0 Vicryl.  Placenta required extra time for separation.  Upon reassessment of the placenta at 45 and 60 minutes after delivery, it was determined that placenta was not .  Manual removal and curettage discussed with pt.  Please see accompanying brief operative note.

## 2024-02-25 NOTE — OB PROVIDER DELIVERY SUMMARY - NSLOWPPHRISK_OBGYN_A_OB
No other PPH risks indicated No previous uterine incision/[place] Pregnancy/No known bleeding disorder/No history of postpartum hemorrhage

## 2024-02-26 VITALS
OXYGEN SATURATION: 98 % | TEMPERATURE: 98 F | DIASTOLIC BLOOD PRESSURE: 64 MMHG | HEART RATE: 74 BPM | SYSTOLIC BLOOD PRESSURE: 106 MMHG | RESPIRATION RATE: 17 BRPM

## 2024-02-26 LAB
HCT VFR BLD CALC: 25.2 % — LOW (ref 34.5–45)
HGB BLD-MCNC: 8.7 G/DL — LOW (ref 11.5–15.5)

## 2024-02-26 RX ORDER — IBUPROFEN 200 MG
1 TABLET ORAL
Qty: 0 | Refills: 0 | DISCHARGE
Start: 2024-02-26

## 2024-02-26 RX ORDER — ACETAMINOPHEN 500 MG
3 TABLET ORAL
Qty: 0 | Refills: 0 | DISCHARGE
Start: 2024-02-26

## 2024-02-26 RX ADMIN — Medication 600 MILLIGRAM(S): at 12:46

## 2024-02-26 RX ADMIN — Medication 975 MILLIGRAM(S): at 04:00

## 2024-02-26 RX ADMIN — Medication 975 MILLIGRAM(S): at 03:10

## 2024-02-26 RX ADMIN — Medication 975 MILLIGRAM(S): at 09:23

## 2024-02-26 RX ADMIN — AER TRAVELER 1 APPLICATION(S): 0.5 SOLUTION RECTAL; TOPICAL at 09:22

## 2024-02-26 RX ADMIN — Medication 1 TABLET(S): at 09:22

## 2024-02-26 RX ADMIN — Medication 600 MILLIGRAM(S): at 00:45

## 2024-02-26 RX ADMIN — Medication 600 MILLIGRAM(S): at 06:16

## 2024-02-26 NOTE — PROGRESS NOTE ADULT - SUBJECTIVE AND OBJECTIVE BOX
35yo  at 37 w by LMP GUSTABO 3/16/2024 c/w 1st trimester sono Patient presented 0n 24 per primary OB for r/o PEC given elevated BP in the office (140s/90s). Pt denies headaches, visual changes, LE swelling, RUQ/Epigastric tenderness, CP, SOB, N/V, ctx, vaginal bleeding, LOF and appreciates positive fetal movement. Pt notes intermittent silver stars in her vision when bending over and coming back up but currently denies it.   Patient undergoing induction of labor   Cervidil placed at 0120 this a.m.    Currently no complaints     with moderate variability and +accels, no decels   Reactive FHT  [place] irregular contractions    Initial cervical exam revealed 0 dilation  
PPD#1  POD#1 removal of entrapped placenta postparutm    Pt doing well, feeling much better after transfusion of blood yesterday    Vital Signs Last 24 Hrs  T(C): 36.6 (2024 04:54), Max: 36.8 (2024 10:00)  T(F): 97.8 (2024 04:54), Max: 98.3 (2024 10:50)  HR: 80 (2024 04:54) (68 - 91)  BP: 108/66 (2024 04:54) (101/59 - 125/73)  BP(mean): 77 (2024 04:54) (77 - 77)  RR: 18 (2024 04:54) (16 - 18)  SpO2: 97% (2024 04:54) (97% - 100%)    Abd:  soft, NT  Fundus:  firm  - Lochia- Minimal  Extr:  no Bryce's sign, +1 pedal edema                        9.5    x     )-----------( x        ( 2024 20:47 )             26.8         A/P:  PPD#1     , POD#1 removal of entrapped placenta post delivery  Transfusion of 2 units PRBC for hemorrhage after placenta was removed, now stable  routine postpartum care and education  Breast feeding support and education.
35 yo at 37 weeks undergoing IOL  s/p cervidil removed ~1320    cervical balloon placed  1.5 cm dilated     with moderate variability and +accels, no decels  Reactive  Comfort q 2 minutes
35 yo at 37 weeks admitted for IOL  Patient now 7 cm dilation     with moderate variability and +accels, no decels  [place]  q 2-3

## 2024-03-01 LAB — SURGICAL PATHOLOGY STUDY: SIGNIFICANT CHANGE UP

## 2024-03-03 DIAGNOSIS — D62 ACUTE POSTHEMORRHAGIC ANEMIA: ICD-10-CM

## 2024-03-03 DIAGNOSIS — Z3A.36 36 WEEKS GESTATION OF PREGNANCY: ICD-10-CM

## 2024-04-09 PROBLEM — F41.9 ANXIETY DISORDER, UNSPECIFIED: Chronic | Status: ACTIVE | Noted: 2024-02-23

## 2024-04-11 ENCOUNTER — OUTPATIENT (OUTPATIENT)
Dept: INPATIENT UNIT | Facility: HOSPITAL | Age: 34
LOS: 1 days | Discharge: HOME | End: 2024-04-11
Payer: COMMERCIAL

## 2024-04-11 ENCOUNTER — TRANSCRIPTION ENCOUNTER (OUTPATIENT)
Age: 34
End: 2024-04-11

## 2024-04-11 VITALS
SYSTOLIC BLOOD PRESSURE: 110 MMHG | HEART RATE: 71 BPM | RESPIRATION RATE: 16 BRPM | TEMPERATURE: 98 F | OXYGEN SATURATION: 100 % | WEIGHT: 126.1 LBS | HEIGHT: 66 IN | DIASTOLIC BLOOD PRESSURE: 72 MMHG

## 2024-04-11 VITALS
HEART RATE: 70 BPM | OXYGEN SATURATION: 100 % | DIASTOLIC BLOOD PRESSURE: 74 MMHG | TEMPERATURE: 97 F | RESPIRATION RATE: 16 BRPM | SYSTOLIC BLOOD PRESSURE: 111 MMHG

## 2024-04-11 DIAGNOSIS — Z98.890 OTHER SPECIFIED POSTPROCEDURAL STATES: Chronic | ICD-10-CM

## 2024-04-11 LAB
ALLERGY+IMMUNOLOGY DIAG STUDY NOTE: SIGNIFICANT CHANGE UP
BLD GP AB SCN SERPL QL: SIGNIFICANT CHANGE UP
DIR ANTIGLOB POLYSPECIFIC INTERPRETATION: SIGNIFICANT CHANGE UP
HCG UR QL: NEGATIVE — SIGNIFICANT CHANGE UP
HCT VFR BLD CALC: 38.6 % — SIGNIFICANT CHANGE UP (ref 34.5–45)
HGB BLD-MCNC: 12.6 G/DL — SIGNIFICANT CHANGE UP (ref 11.5–15.5)
MCHC RBC-ENTMCNC: 28.8 PG — SIGNIFICANT CHANGE UP (ref 27–34)
MCHC RBC-ENTMCNC: 32.6 GM/DL — SIGNIFICANT CHANGE UP (ref 32–36)
MCV RBC AUTO: 88.1 FL — SIGNIFICANT CHANGE UP (ref 80–100)
PLATELET # BLD AUTO: 251 K/UL — SIGNIFICANT CHANGE UP (ref 150–400)
RBC # BLD: 4.38 M/UL — SIGNIFICANT CHANGE UP (ref 3.8–5.2)
RBC # FLD: 12.7 % — SIGNIFICANT CHANGE UP (ref 10.3–14.5)
WBC # BLD: 8.35 K/UL — SIGNIFICANT CHANGE UP (ref 3.8–10.5)
WBC # FLD AUTO: 8.35 K/UL — SIGNIFICANT CHANGE UP (ref 3.8–10.5)

## 2024-04-11 PROCEDURE — 81025 URINE PREGNANCY TEST: CPT

## 2024-04-11 PROCEDURE — 86901 BLOOD TYPING SEROLOGIC RH(D): CPT

## 2024-04-11 PROCEDURE — 86077 PHYS BLOOD BANK SERV XMATCH: CPT

## 2024-04-11 PROCEDURE — 86870 RBC ANTIBODY IDENTIFICATION: CPT

## 2024-04-11 PROCEDURE — 86905 BLOOD TYPING RBC ANTIGENS: CPT

## 2024-04-11 PROCEDURE — 36415 COLL VENOUS BLD VENIPUNCTURE: CPT

## 2024-04-11 PROCEDURE — 86850 RBC ANTIBODY SCREEN: CPT

## 2024-04-11 PROCEDURE — 88305 TISSUE EXAM BY PATHOLOGIST: CPT

## 2024-04-11 PROCEDURE — 86880 COOMBS TEST DIRECT: CPT

## 2024-04-11 PROCEDURE — 85027 COMPLETE CBC AUTOMATED: CPT

## 2024-04-11 PROCEDURE — 88305 TISSUE EXAM BY PATHOLOGIST: CPT | Mod: 26

## 2024-04-11 PROCEDURE — 86900 BLOOD TYPING SEROLOGIC ABO: CPT

## 2024-04-11 RX ORDER — ASCORBIC ACID 60 MG
2 TABLET,CHEWABLE ORAL
Qty: 0 | Refills: 0 | DISCHARGE

## 2024-04-11 RX ORDER — ONDANSETRON 8 MG/1
4 TABLET, FILM COATED ORAL ONCE
Refills: 0 | Status: DISCONTINUED | OUTPATIENT
Start: 2024-04-11 | End: 2024-04-11

## 2024-04-11 RX ORDER — SODIUM CHLORIDE 9 MG/ML
1000 INJECTION, SOLUTION INTRAVENOUS
Refills: 0 | Status: DISCONTINUED | OUTPATIENT
Start: 2024-04-11 | End: 2024-04-11

## 2024-04-11 RX ORDER — IBUPROFEN 200 MG
1 TABLET ORAL
Qty: 28 | Refills: 1
Start: 2024-04-11 | End: 2024-04-24

## 2024-04-11 RX ORDER — OXYCODONE HYDROCHLORIDE 5 MG/1
5 TABLET ORAL ONCE
Refills: 0 | Status: DISCONTINUED | OUTPATIENT
Start: 2024-04-11 | End: 2024-04-11

## 2024-04-11 RX ORDER — FENTANYL CITRATE 50 UG/ML
50 INJECTION INTRAVENOUS
Refills: 0 | Status: DISCONTINUED | OUTPATIENT
Start: 2024-04-11 | End: 2024-04-11

## 2024-04-11 NOTE — BRIEF OPERATIVE NOTE - DISPOSITION
Type 2 diabetes mellitus home Type 2 diabetes mellitus Type 2 diabetes mellitus Type 2 diabetes mellitus Type 2 diabetes mellitus

## 2024-04-11 NOTE — ASU DISCHARGE PLAN (ADULT/PEDIATRIC) - NS MD DC FALL RISK RISK
For information on Fall & Injury Prevention, visit: [email or web address] OR  [email or web address] OR  https://www.cdc.gov/steadi/patient.html

## 2024-04-11 NOTE — ASU PATIENT PROFILE, ADULT - FALL HARM RISK - UNIVERSAL INTERVENTIONS
Bed in lowest position, wheels locked, appropriate side rails in place/Call bell, personal items and telephone in reach/Instruct patient to call for assistance before getting out of bed or chair/Non-slip footwear when patient is out of bed/[place] to call system/Physically safe environment - no spills, clutter or unnecessary equipment/Purposeful Proactive Rounding/Room/bathroom lighting operational, light cord in reach

## 2024-04-11 NOTE — ASU PATIENT PROFILE, ADULT - NSICDXPASTMEDICALHX_GEN_ALL_CORE_FT
PAST MEDICAL HISTORY:  Anxiety     History of blood transfusion     History of retained placenta      (spontaneous vaginal delivery)

## 2024-04-15 ENCOUNTER — RESULT CHARGE (OUTPATIENT)
Age: 34
End: 2024-04-15

## 2024-04-15 ENCOUNTER — APPOINTMENT (OUTPATIENT)
Dept: OBGYN | Facility: CLINIC | Age: 34
End: 2024-04-15
Payer: COMMERCIAL

## 2024-04-15 DIAGNOSIS — Z00.00 ENCOUNTER FOR GENERAL ADULT MEDICAL EXAMINATION W/OUT ABNORMAL FINDINGS: ICD-10-CM

## 2024-04-15 PROBLEM — Z92.89 PERSONAL HISTORY OF OTHER MEDICAL TREATMENT: Chronic | Status: ACTIVE | Noted: 2024-04-10

## 2024-04-15 PROBLEM — Z87.59 PERSONAL HISTORY OF OTHER COMPLICATIONS OF PREGNANCY, CHILDBIRTH AND THE PUERPERIUM: Chronic | Status: ACTIVE | Noted: 2024-04-11

## 2024-04-15 LAB
BILIRUB UR QL STRIP: NEGATIVE
CLARITY UR: CLEAR
COLLECTION METHOD: NORMAL
GLUCOSE UR-MCNC: NEGATIVE
HCG UR QL: 0 EU/DL
HGB UR QL STRIP.AUTO: NORMAL
KETONES UR-MCNC: NORMAL
LEUKOCYTE ESTERASE UR QL STRIP: NORMAL
NITRITE UR QL STRIP: NEGATIVE
PH UR STRIP: 6
PROT UR STRIP-MCNC: NEGATIVE
SP GR UR STRIP: 1
SURGICAL PATHOLOGY STUDY: SIGNIFICANT CHANGE UP

## 2024-04-15 PROCEDURE — 99202 OFFICE O/P NEW SF 15 MIN: CPT

## 2024-04-15 PROCEDURE — 99459 PELVIC EXAMINATION: CPT

## 2024-04-15 NOTE — REASON FOR VISIT
[Initial] : an initial consultation for [Abnormal Uterine Bleeding] : abnormal uterine bleeding [Spouse] : spouse [Other: _____] : [unfilled]

## 2024-04-16 LAB — HEMOGLOBIN: 11.7

## 2024-04-16 NOTE — HISTORY OF PRESENT ILLNESS
[FreeTextEntry1] : Patient is a 35 yo female here today for initial visit for c/o vaginal bleeding post D&C for retained placenta. Pt 7 weeks post-partum delivered vaginally.

## 2024-04-16 NOTE — PHYSICAL EXAM
[Chaperone Present] : A chaperone was present in the examining room during all aspects of the physical examination [Labia Majora] : normal [Labia Minora] : normal [Normal] : normal [Retroversion] : retroverted [Uterine Adnexae] : normal [FreeTextEntry2] : Anna FNP-BC [FreeTextEntry5] : closed

## 2024-04-16 NOTE — PLAN
[FreeTextEntry1] : Pt with cytotec in place call if bleeding becomes heavy  return in 2 weeks for follow up, consider placing IUD would not recommend D&C at this time  AQA pt verbalized understanding  I Norma Renteria [facility] am scribing for the presence of Dr. Ram the following sections HISTORY OF PRESENT ILLNESS, PAST MEDICAL/FAMILY/SOCIAL HISTORY; REVIEW OF SYSTEMS; VITAL SIGNS; PHYSICAL EXAM; DISPOSITION. I personally performed the services described in the documentation, reviewed the documentation recorded by the scribe in my presence and it accurately and completely records my words and actions.

## 2024-04-22 ENCOUNTER — TRANSCRIPTION ENCOUNTER (OUTPATIENT)
Age: 34
End: 2024-04-22

## 2024-04-29 ENCOUNTER — TRANSCRIPTION ENCOUNTER (OUTPATIENT)
Age: 34
End: 2024-04-29

## 2024-05-03 ENCOUNTER — APPOINTMENT (OUTPATIENT)
Dept: OBGYN | Facility: CLINIC | Age: 34
End: 2024-05-03
Payer: COMMERCIAL

## 2024-05-03 DIAGNOSIS — Z30.430 ENCOUNTER FOR INSERTION OF INTRAUTERINE CONTRACEPTIVE DEVICE: ICD-10-CM

## 2024-05-03 PROCEDURE — 58300 INSERT INTRAUTERINE DEVICE: CPT

## 2024-05-05 NOTE — PHYSICAL EXAM
[Chaperone Present] : A chaperone was present in the examining room during all aspects of the physical examination [Labia Minora] : normal [Labia Majora] : normal [Normal] : normal [Uterine Adnexae] : normal [FreeTextEntry2] : Anna FNP-BC

## 2024-05-05 NOTE — PROCEDURE
[IUD Placement] : intrauterine device (IUD) placement [Time out performed] : Pre-procedure time out performed.  Patient's name, date of birth and procedure confirmed. [Consent Obtained] : Consent obtained [Prevention of Pregnancy] : prevention of pregnancy [Risks] : risks [Benefits] : benefits [Alternatives] : alternatives [Patient] : patient [Infection] : infection [Bleeding] : bleeding [Pain] : pain [Expulsion] : expulsion [Failure] : failure [Uterine Perforation] : uterine perforation [No Premedication] : No premedication [Tenaculum] : Tenaculum [Easy Passage] : Easy passage [Mirena IUD] : Mirena IUD [Tolerated Well] : Patient tolerated the procedure well [No Complications] : No complications [None] : None [de-identified] : [id number] [de-identified] : 06/2026

## 2024-05-05 NOTE — PLAN
[FreeTextEntry1] : Mirena IUD placed successfully return for annual visit AQA Pt verbalized understanding  I Norma Renteria [facility]-BC am scribing for the presence of Dr. Ram the following sections HISTORY OF PRESENT ILLNESS, PAST MEDICAL/FAMILY/SOCIAL HISTORY; REVIEW OF SYSTEMS; VITAL SIGNS; PHYSICAL EXAM; DISPOSITION. I personally performed the services described in the documentation, reviewed the documentation recorded by the scribe in my presence and it accurately and completely records my words and actions.

## 2024-05-05 NOTE — HISTORY OF PRESENT ILLNESS
[FreeTextEntry1] : Patient is a 33yo female here today for follow up for post-partum bleeding. Pt reports bleeding has improved since last visit, interested in IUD for birthcontrol.  Mirena IUD provided by office today for pts visit

## 2024-05-30 NOTE — OB RN PATIENT PROFILE - NS_GBS_INFANT_INVASIVE_OBGYN_ALL_OB_FT
Glucermandy Therapeutic Nutrition Shake 240mls 2x daily (440kcals, 20g protein) 
Patient states no history

## 2024-06-26 NOTE — OB RN PATIENT PROFILE - NS_PRENATALLABSOURCEGBS1PN_OBGYN_ALL_OB
DWAIN met with pt and spouse to complete ACP. Pt named spouse, JANICE Garcia.  Pt's grandmother, Kristine is secondary HCA. DWAIN notarized document on this date and provided pt and spouse with original plus five copies per request. DWAIN submitted copy to be scanned into chart.    unknown

## 2024-09-09 ENCOUNTER — APPOINTMENT (OUTPATIENT)
Dept: OBGYN | Facility: CLINIC | Age: 34
End: 2024-09-09

## 2024-09-12 ENCOUNTER — APPOINTMENT (OUTPATIENT)
Dept: OBGYN | Facility: CLINIC | Age: 34
End: 2024-09-12
Payer: COMMERCIAL

## 2024-09-12 VITALS
SYSTOLIC BLOOD PRESSURE: 126 MMHG | HEIGHT: 66 IN | DIASTOLIC BLOOD PRESSURE: 84 MMHG | WEIGHT: 110 LBS | BODY MASS INDEX: 17.68 KG/M2 | HEART RATE: 90 BPM

## 2024-09-12 DIAGNOSIS — Z12.4 ENCOUNTER FOR SCREENING FOR MALIGNANT NEOPLASM OF CERVIX: ICD-10-CM

## 2024-09-12 DIAGNOSIS — Z01.419 ENCOUNTER FOR GYNECOLOGICAL EXAMINATION (GENERAL) (ROUTINE) W/OUT ABNORMAL FINDINGS: ICD-10-CM

## 2024-09-12 PROCEDURE — 85018 HEMOGLOBIN: CPT | Mod: QW

## 2024-09-12 PROCEDURE — 99395 PREV VISIT EST AGE 18-39: CPT

## 2024-09-12 NOTE — PLAN
[FreeTextEntry1] : Patient to follow up in 1 year for annual GYN exam IUD strings trimmed today discussed supportive care measures for clogged duct. discussed s/s of mastitis.  she does not desire pregnancy in the near future as the retained placenta and heavy bleeding post partum was traumatic.  Mammogram due: 42 Colonoscopy due: 47 Bone density due: Pm Pap ordered All questions answered, patient is agreeable with plan.

## 2024-09-12 NOTE — HISTORY OF PRESENT ILLNESS
[TextBox_4] : 34 year old female presents for her annual visit. Denies GYN complaints at this time. PMH: vaginal delivery delivered by Dr. Augustin. she had retained placenta requiring D&C. Mirena IUD placed 05/2024. she is actively breast feeding. amenorrheic at this time. she has recurrent clogged duct on her right nipple.

## 2024-09-13 ENCOUNTER — NON-APPOINTMENT (OUTPATIENT)
Age: 34
End: 2024-09-13

## 2024-09-13 LAB — HEMOGLOBIN: 13.2

## 2024-09-17 LAB — CYTOLOGY CVX/VAG DOC THIN PREP: NORMAL

## 2024-10-28 NOTE — BRIEF OPERATIVE NOTE - NSICDXBRIEFPOSTOP_GEN_ALL_CORE_FT
POST-OP DIAGNOSIS:  Retained complete placenta 25-Feb-2024 03:41:15  Farhat Augustin  
0 (no pain/absence of nonverbal indicators of pain)

## 2024-11-13 ENCOUNTER — TRANSCRIPTION ENCOUNTER (OUTPATIENT)
Age: 34
End: 2024-11-13

## 2025-04-04 NOTE — ED ADULT TRIAGE NOTE - HEIGHT IN FEET
Per chart review, patient was recently seen outpatient on 4/1 in which there was concern for depression given the recent passing of her father who passed away in December. At that time, she was complaining of fatigue, memory issues, and tearfulness during the exam. According to the chart, she relied on him for emotional support and is also the sole caretaker of her brother who struggles with intellectual disability    During the encounter here in the hospital, mention of her brother was accompanied by tearfulness    Suspect psychosocial component of patient's presentation.    Plan:  Continue to monitor s/s  Patient has outpatient visit on 4/23 for grief follow-up  Continue reassurance   5

## 2025-05-15 NOTE — ASU DISCHARGE PLAN (ADULT/PEDIATRIC) - DO NOT DRIVE IF TAKING PAIN MEDICATION
-go to lab H. Pylori breath test & celiac serology     -US gallbladder    -diet high in fiber    -avoid GERD triggering foods and eating late    -check with your insurance about dietician options  
NULL

## 2025-06-23 ENCOUNTER — APPOINTMENT (OUTPATIENT)
Dept: GASTROENTEROLOGY | Facility: CLINIC | Age: 35
End: 2025-06-23

## 2025-09-17 ENCOUNTER — NON-APPOINTMENT (OUTPATIENT)
Age: 35
End: 2025-09-17

## 2025-09-18 ENCOUNTER — APPOINTMENT (OUTPATIENT)
Dept: OBGYN | Facility: CLINIC | Age: 35
End: 2025-09-18
Payer: COMMERCIAL

## 2025-09-18 VITALS — HEIGHT: 66 IN | WEIGHT: 112 LBS | BODY MASS INDEX: 18 KG/M2

## 2025-09-18 VITALS — SYSTOLIC BLOOD PRESSURE: 135 MMHG | HEART RATE: 83 BPM | DIASTOLIC BLOOD PRESSURE: 91 MMHG

## 2025-09-18 DIAGNOSIS — Z12.4 ENCOUNTER FOR SCREENING FOR MALIGNANT NEOPLASM OF CERVIX: ICD-10-CM

## 2025-09-18 DIAGNOSIS — R10.2 PELVIC AND PERINEAL PAIN: ICD-10-CM

## 2025-09-18 DIAGNOSIS — Z30.432 ENCOUNTER FOR REMOVAL OF INTRAUTERINE CONTRACEPTIVE DEVICE: ICD-10-CM

## 2025-09-18 DIAGNOSIS — Z01.419 ENCOUNTER FOR GYNECOLOGICAL EXAMINATION (GENERAL) (ROUTINE) W/OUT ABNORMAL FINDINGS: ICD-10-CM

## 2025-09-18 LAB
APPEARANCE: CLEAR
BILIRUBIN URINE: NEGATIVE
BLOOD URINE: NEGATIVE
COLOR: YELLOW
GLUCOSE QUALITATIVE U: NEGATIVE
HEMOGLOBIN: 13.1
KETONES URINE: NEGATIVE
LEUKOCYTE ESTERASE URINE: ABNORMAL
NITRITE URINE: NEGATIVE
PH URINE: 6.5
PROTEIN URINE: NEGATIVE
SPECIFIC GRAVITY URINE: <=1.005
UROBILINOGEN URINE: 0.2 (ref 0.2–?)

## 2025-09-18 PROCEDURE — 85018 HEMOGLOBIN: CPT | Mod: QW

## 2025-09-18 PROCEDURE — 58301 REMOVE INTRAUTERINE DEVICE: CPT

## 2025-09-18 PROCEDURE — 99395 PREV VISIT EST AGE 18-39: CPT | Mod: 25

## 2025-09-24 LAB — CYTOLOGY CVX/VAG DOC THIN PREP: NORMAL
